# Patient Record
(demographics unavailable — no encounter records)

---

## 2024-11-12 NOTE — HISTORY OF PRESENT ILLNESS
[FreeTextEntry1] : The patient is here for a routine visit.  [de-identified] : She feels well.  She exercises and she watches her diet.

## 2024-11-12 NOTE — ASSESSMENT
[FreeTextEntry1] : The BP is good at 122/68.  Check lipids on Simvastatin.  Discussed diet and exercise.  Check a HgBA1c which was 6.2.  Mammogram, DEXA, colonoscopy, gyn visit UTD.  S/p flu vaccine, RSV vaccine, new COVID-19 vaccine.

## 2025-05-19 NOTE — HISTORY OF PRESENT ILLNESS
[FreeTextEntry1] : The patient is here for a routine visit.  [de-identified] : Her det is fair.  She is active and walks.  No chest pain or dyspnea.

## 2025-05-19 NOTE — ASSESSMENT
[Vaccines Reviewed] : Immunizations reviewed today. Please see immunization details in the vaccine log within the immunization flowsheet.  [FreeTextEntry1] : The BP is very good at 120/62.    Check lipids on Simvastatin.  Discussed diet and exercise.  Check a HgBA1c which was 6.1.  She sees her cardiologist, Dr. Bang.    She is on Risedronate.  DEXA 6/24.  She sees a gyn.  Mammogram 6/24.  Colonoscopy 7/24- five year follow-up.  She sees a dermatologist and ophthalmologist.   S/p current COVID-19 vaccine, RSV, Shingrix, Prevnar 13, Pneumovax.  She will check if she had Prevnar 20 at her pharmacy- if not, she should have it done.

## 2025-05-19 NOTE — HEALTH RISK ASSESSMENT
[No falls in past year] : Patient reported no falls in the past year [Patient/Caregiver not ready to engage] : , patient/caregiver not ready to engage [Change in mental status noted] : No change in mental status noted [Language] : denies difficulty with language [Behavior] : denies difficulty with behavior [Learning/Retaining New Information] : denies difficulty learning/retaining new information [Handling Complex Tasks] : denies difficulty handling complex tasks [Reasoning] : denies difficulty with reasoning [Spatial Ability and Orientation] : denies difficulty with spatial ability and orientation